# Patient Record
Sex: FEMALE | NOT HISPANIC OR LATINO | URBAN - METROPOLITAN AREA
[De-identification: names, ages, dates, MRNs, and addresses within clinical notes are randomized per-mention and may not be internally consistent; named-entity substitution may affect disease eponyms.]

---

## 2024-03-14 ENCOUNTER — TELEMEDICINE (OUTPATIENT)
Dept: PRIMARY CARE | Facility: CLINIC | Age: 29
End: 2024-03-14

## 2024-03-14 DIAGNOSIS — L23.0 ALLERGIC CONTACT DERMATITIS DUE TO METALS: Primary | ICD-10-CM

## 2024-03-14 PROCEDURE — 99212 OFFICE O/P EST SF 10 MIN: CPT | Performed by: FAMILY MEDICINE

## 2024-03-14 RX ORDER — DESONIDE 0.5 MG/G
OINTMENT TOPICAL 2 TIMES DAILY PRN
Qty: 30 G | Refills: 0 | Status: SHIPPED | OUTPATIENT
Start: 2024-03-14 | End: 2025-03-14

## 2024-03-14 NOTE — PROGRESS NOTES
I performed this visit using realtime telehealth tools, including an audio/video OR telephone connection between the patient listed who was located in the [facility] [place] and myself, Yolanda Chacon (Board certified in the [facility]).  At the start of the visit, I introduced myself as Dr. Carcamo and verified the patients name, , and current physical location.    If they were currently outside of the state of [place], the visit was ended and the patient was referred to alternative means for evaluation and treatment.   The patient was made aware of the limitations of the telehealth visit.  They will not be physically examined and all issues may not be appropriate for a telehealth visit.  If necessary, an in person referral will be made.      All allergies were reviewed as well as reconciliation of all medications.      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     rash on fingers-- then worsened  Finger tips have some blistered--  Right index finger and right thumb candido  Bumps on fingers too--spreading on other fingertips--has not touched anything that she is allergic to that she knows of  Also got on face and eyelids  Itchy if accidentally scratches it  [facility] not sure what it was  No lip tongue throat swelling  No wheezing chest pain or heaviness  No abdominal cramping or diarrhea  Outside exposure-no--  Pets--house sitting for 3 cats--indoor  She has 2 dogs and a cat-- one is a puppy--just starting to go in the woods--other dog out on leash       ALL OTHER ROS (-)    General appearance:  Vitals available from patient?No    Alert, oriented, pleasant, in no apparent distress Yes  Answers questions appropriatelyYes  Eyes clear?Yes    Psychiatric: Affect normalYes    Other relevant physical  exam:      Pt location in [place] and consent obtained. Telemedicine appropriate evaluation completed. Appropriate physical exam done.    With history of nail polish-metallic--and some others CAN contain nickel-- may have gotten on hands and touched face  Desonide ointment as written--discussed adverse effects---Use sparingly ONLY on affected skin in a THIN LAYER for the shortest duration of time needed.  Can cause lightening of the skin, spider veins, thinning of the skin, and/or stretch marks.  Not contagious to others  Check nail polish ingredients going forward  Follow up if worsens or persists       3/22 MyChart Message  Sanjuana. It’s Valentin. My skin is very dry and peeling but no longer itchy or hurting too much. It hurts if I peel some of the skin but I’m doing my best not to peel or pick at it. And my face is all cleared up.

## 2024-03-24 NOTE — PATIENT INSTRUCTIONS
With history of nail polish-metallic--and some others CAN contain nickel-- may have gotten on hands and touched face  Desonide ointment as written--discussed adverse effects---Use sparingly ONLY on affected skin in a THIN LAYER for the shortest duration of time needed.  Can cause lightening of the skin, spider veins, thinning of the skin, and/or stretch marks.  Not contagious to others  Check nail polish ingredients going forward  Follow up if worsens or persists     Please send me a Meitu message if you have any questions or concerns.  FOR NON URGENT questions only.  Allow up to 72 hours for response.    If you have prescription issues or other questions you can email   Fletcher Masters  Digital Health Coordinator, at   [email or web address]     Rest and drink plenty of fluids    Tylenol and or motrin as needed for pain and fever (unless you have been told not to take these because of your personal medical history)    Discussed options and precautions (complaint specific and may include)  Viral versus bacterial infection; use of medications; possible side effects; appropriate over-the-counter medications; possible complications and /or when to follow-up.    Follow-up in 1 to 2 days if not improving.  Follow-up immediately if symptoms worsen.    All red flags requiring in person care were discussed.  All patient's questions were answered.    To connect with a new PCP please visit [email or web address] or call [phone]     If experiencing any severe or worsening symptoms including but not limited to lethargy / chest pain / weakness / dizziness / difficulty breathing please call 911 or go to the emergency department for immediate care!    Limitations to telemedicine include inability to do a complete and accurate physical exam.  Any concerns regarding this were conveyed with the patient and in person follow-up recommended if patient nature of illness does not progress as  anticipated during this visit.    CDC updated Respiratory Infection guidelines:   When you have a respiratory virus, stay home and away from others (including people  you live with who are not sick) Symptoms can include fever, chills, fatigue, cough,  runny nose, and headache (and others).    You can go back to your normal activities when,   for at least 24 hours,   BOTH are true:    1) Your symptoms are getting better overall  2) You have not had a fever (and are not using fever-reducing medication).    When you go back to your normal activities, take added precaution over the next 5 days, such as taking additional steps for  air, hygiene, masks, physical distancing, and/or testing when you will be around other people indoors.    Keep in mind that you may still be able to spread the virus that made you sick, even if you are feeling better. You are likely to be less contagious at this time, depending on factors like how long you were sick or how sick you were.    If you develop a fever or you start to feel worse after you have gone back to normal activities, stay home and away from others again until, for at least 24 hours, both are true: your symptoms are improving overall, and you have not had a fever (and are not using fever-reducing medication). Then take added precaution for the next 5 days.    If you never had symptoms but tested positive for a respiratory virus?, you may be contagious. For the next 5 days: take added precaution, such as taking additional steps for  air, hygiene, masks, physical distancing, and/or testing when you will be around other people indoors. This is especially important to protect people with factors that increase their risk of severe illness from respiratory viruses.  Avoid immunocompromised, elderly, pregnant women, infants etc    Have a low threshold for in person evaluation if your symptoms worsen.